# Patient Record
Sex: FEMALE | Race: WHITE | Employment: FULL TIME | URBAN - METROPOLITAN AREA
[De-identification: names, ages, dates, MRNs, and addresses within clinical notes are randomized per-mention and may not be internally consistent; named-entity substitution may affect disease eponyms.]

---

## 2017-09-22 ENCOUNTER — HOSPITAL ENCOUNTER (OUTPATIENT)
Dept: MAMMOGRAPHY | Age: 52
Discharge: HOME | End: 2017-09-22
Attending: OBSTETRICS & GYNECOLOGY | Admitting: OBSTETRICS & GYNECOLOGY

## 2017-09-22 DIAGNOSIS — Z12.31 VISIT FOR SCREENING MAMMOGRAM: ICD-10-CM

## 2018-10-17 ENCOUNTER — HOSPITAL ENCOUNTER (OUTPATIENT)
Dept: WOMENS IMAGING | Age: 53
Discharge: HOME | End: 2018-10-17
Payer: COMMERCIAL

## 2018-10-17 DIAGNOSIS — Z12.39 BREAST CANCER SCREENING: ICD-10-CM

## 2018-10-17 PROCEDURE — 77067 SCR MAMMO BI INCL CAD: CPT

## 2019-01-31 LAB
CANDIDA SPECIES, DNA PROBE: ABNORMAL
GARDNERELLA VAGINALIS, DNA PROBE: ABNORMAL
TRICHOMONAS VAGINALIS DNA: ABNORMAL

## 2019-10-01 LAB
HPV COMMENT: NORMAL
HPV TYPE 16: NOT DETECTED
HPV TYPE 18: NOT DETECTED
HPVOH (OTHER TYPES): NOT DETECTED

## 2019-10-24 ENCOUNTER — HOSPITAL ENCOUNTER (OUTPATIENT)
Dept: MAMMOGRAPHY | Age: 54
Discharge: HOME | End: 2019-10-24
Payer: COMMERCIAL

## 2019-10-24 DIAGNOSIS — Z12.31 BREAST CANCER SCREENING BY MAMMOGRAM: ICD-10-CM

## 2019-10-24 PROCEDURE — 77067 SCR MAMMO BI INCL CAD: CPT

## 2020-12-04 ENCOUNTER — HOSPITAL ENCOUNTER (OUTPATIENT)
Dept: WOMENS IMAGING | Age: 55
Discharge: HOME | End: 2020-12-04
Payer: COMMERCIAL

## 2020-12-04 PROCEDURE — 77067 SCR MAMMO BI INCL CAD: CPT

## 2022-01-14 ENCOUNTER — HOSPITAL ENCOUNTER (OUTPATIENT)
Dept: WOMENS IMAGING | Age: 57
Discharge: HOME | End: 2022-01-14
Payer: COMMERCIAL

## 2022-01-14 VITALS — WEIGHT: 145 LBS | HEIGHT: 67 IN | BODY MASS INDEX: 22.76 KG/M2

## 2022-01-14 DIAGNOSIS — Z12.31 BREAST CANCER SCREENING BY MAMMOGRAM: ICD-10-CM

## 2022-01-14 PROCEDURE — 77063 BREAST TOMOSYNTHESIS BI: CPT

## 2023-01-30 ENCOUNTER — HOSPITAL ENCOUNTER (OUTPATIENT)
Dept: WOMENS IMAGING | Age: 58
Discharge: HOME | End: 2023-01-30
Payer: COMMERCIAL

## 2023-01-30 VITALS — WEIGHT: 150 LBS | HEIGHT: 66 IN | BODY MASS INDEX: 24.11 KG/M2

## 2023-01-30 DIAGNOSIS — Z12.31 VISIT FOR SCREENING MAMMOGRAM: ICD-10-CM

## 2023-01-30 PROCEDURE — 77067 SCR MAMMO BI INCL CAD: CPT

## 2024-02-08 ENCOUNTER — HOSPITAL ENCOUNTER (OUTPATIENT)
Dept: WOMENS IMAGING | Age: 59
Discharge: HOME | End: 2024-02-08
Payer: COMMERCIAL

## 2024-02-08 VITALS — HEIGHT: 66 IN | BODY MASS INDEX: 23.46 KG/M2 | WEIGHT: 146 LBS

## 2024-02-08 DIAGNOSIS — Z12.31 VISIT FOR SCREENING MAMMOGRAM: ICD-10-CM

## 2024-02-08 PROCEDURE — 77063 BREAST TOMOSYNTHESIS BI: CPT

## 2024-05-03 ENCOUNTER — OFFICE VISIT (OUTPATIENT)
Dept: PRIMARY CARE CLINIC | Age: 59
End: 2024-05-03
Payer: COMMERCIAL

## 2024-05-03 VITALS
HEIGHT: 67 IN | HEART RATE: 90 BPM | SYSTOLIC BLOOD PRESSURE: 120 MMHG | WEIGHT: 146 LBS | TEMPERATURE: 97.7 F | DIASTOLIC BLOOD PRESSURE: 78 MMHG | OXYGEN SATURATION: 97 % | BODY MASS INDEX: 22.91 KG/M2

## 2024-05-03 DIAGNOSIS — H66.92 LEFT ACUTE OTITIS MEDIA: Primary | ICD-10-CM

## 2024-05-03 DIAGNOSIS — J02.9 SORE THROAT: ICD-10-CM

## 2024-05-03 LAB — S PYO AG THROAT QL: NORMAL

## 2024-05-03 PROCEDURE — 87880 STREP A ASSAY W/OPTIC: CPT | Performed by: NURSE PRACTITIONER

## 2024-05-03 PROCEDURE — 99213 OFFICE O/P EST LOW 20 MIN: CPT | Performed by: NURSE PRACTITIONER

## 2024-05-03 RX ORDER — AMOXICILLIN AND CLAVULANATE POTASSIUM 875; 125 MG/1; MG/1
1 TABLET, FILM COATED ORAL 2 TIMES DAILY
Qty: 20 TABLET | Refills: 0 | Status: SHIPPED | OUTPATIENT
Start: 2024-05-03 | End: 2024-05-13

## 2024-05-03 SDOH — ECONOMIC STABILITY: FOOD INSECURITY: WITHIN THE PAST 12 MONTHS, THE FOOD YOU BOUGHT JUST DIDN'T LAST AND YOU DIDN'T HAVE MONEY TO GET MORE.: NEVER TRUE

## 2024-05-03 SDOH — ECONOMIC STABILITY: FOOD INSECURITY: WITHIN THE PAST 12 MONTHS, YOU WORRIED THAT YOUR FOOD WOULD RUN OUT BEFORE YOU GOT MONEY TO BUY MORE.: NEVER TRUE

## 2024-05-03 SDOH — ECONOMIC STABILITY: HOUSING INSECURITY
IN THE LAST 12 MONTHS, WAS THERE A TIME WHEN YOU DID NOT HAVE A STEADY PLACE TO SLEEP OR SLEPT IN A SHELTER (INCLUDING NOW)?: NO

## 2024-05-03 SDOH — ECONOMIC STABILITY: INCOME INSECURITY: HOW HARD IS IT FOR YOU TO PAY FOR THE VERY BASICS LIKE FOOD, HOUSING, MEDICAL CARE, AND HEATING?: NOT HARD AT ALL

## 2024-05-03 ASSESSMENT — PATIENT HEALTH QUESTIONNAIRE - PHQ9
SUM OF ALL RESPONSES TO PHQ QUESTIONS 1-9: 0
SUM OF ALL RESPONSES TO PHQ QUESTIONS 1-9: 0
1. LITTLE INTEREST OR PLEASURE IN DOING THINGS: NOT AT ALL
SUM OF ALL RESPONSES TO PHQ QUESTIONS 1-9: 0
SUM OF ALL RESPONSES TO PHQ9 QUESTIONS 1 & 2: 0
2. FEELING DOWN, DEPRESSED OR HOPELESS: NOT AT ALL
SUM OF ALL RESPONSES TO PHQ QUESTIONS 1-9: 0

## 2024-05-03 NOTE — PROGRESS NOTES
°F (36.5 °C)   SpO2: 97%   Weight: 66.2 kg (146 lb)   Height: 1.689 m (5' 6.5\")         Physical Exam  Constitutional:       Appearance: Normal appearance.   HENT:      Head: Normocephalic and atraumatic.      Right Ear: Tympanic membrane, ear canal and external ear normal.      Left Ear: Tenderness present. A middle ear effusion is present. No mastoid tenderness. Tympanic membrane is erythematous.      Nose: Nose normal.      Mouth/Throat:      Mouth: Mucous membranes are moist.      Pharynx: Oropharynx is clear.   Eyes:      Extraocular Movements: Extraocular movements intact.      Conjunctiva/sclera: Conjunctivae normal.      Pupils: Pupils are equal, round, and reactive to light.   Cardiovascular:      Rate and Rhythm: Normal rate and regular rhythm.      Pulses: Normal pulses.      Heart sounds: Normal heart sounds.   Pulmonary:      Effort: Pulmonary effort is normal.      Breath sounds: Normal breath sounds.   Musculoskeletal:      Cervical back: Normal range of motion and neck supple.   Lymphadenopathy:      Cervical: No cervical adenopathy.   Neurological:      Mental Status: She is alert.         ASSESSMENT/PLAN:  1. Left acute otitis media  -     amoxicillin-clavulanate (AUGMENTIN) 875-125 MG per tablet; Take 1 tablet by mouth 2 times daily for 10 days, Disp-20 tablet, R-0Normal  2. Sore throat  -     POCT rapid strep A- Negative, Will send for culture  -     Culture, Throat- will notify when resulted and POC  Schedule need fasting physical     Home Care Instructions  Notify office if you do not improve or have worsening of condition.  Take medication as prescribed  Take antibiotic medication until ALL GONE  Drink plenty of fluids and rest  Do not eat or drink after anyone  Do not share utensils  Practice good hand hygiene  May sleep with humidifier as needed  May take tylenol/Ibuprofen Tylenol and Motrin every 6 hours alternating (so that he/she is getting either one every 3 hours) for the next 48

## 2024-05-03 NOTE — PATIENT INSTRUCTIONS
Schedule need fasting physical     Home Care Instructions  Notify office if you do not improve or have worsening of condition.  Take medication as prescribed  Take antibiotic medication until ALL GONE  Drink plenty of fluids and rest  Do not eat or drink after anyone  Do not share utensils  Practice good hand hygiene  May sleep with humidifier as needed  May take tylenol/Ibuprofen Tylenol and Motrin every 6 hours alternating (so that he/she is getting either one every 3 hours) for the next 48 hours.  After day 3 change out toothbrush to a new one  Cover your mouth and nose when you cough or sneeze  Sore throat: gargle with warm salt water 3-4 times a day, you can use ice, warm chicken noodle soup, soft foods, popsicles, cough drops  Cough- suggest that airway irritation contributing to cough be relieved with oral hydration, warm fluids (eg, tea, chicken soup), honey (in children older than one year), or cough lozenges or hard candy (in children in whom they are not an aspiration risk) rather than OTC or prescription antitussives, antihistamines, expectorants, or mucolytics

## 2024-05-06 LAB
BACTERIA THROAT AEROBE CULT: ABNORMAL
BACTERIA THROAT AEROBE CULT: ABNORMAL
ORGANISM: ABNORMAL

## 2024-05-06 NOTE — RESULT ENCOUNTER NOTE
Please call Hailee, to see how she is feeling, her Preliminary culture does show some growth, if she is not seeing an improvement with the antibiotic given would like to change to a different antibiotic such as Bactrim.

## 2024-05-08 ASSESSMENT — ENCOUNTER SYMPTOMS
COUGH: 1
SHORTNESS OF BREATH: 0
VOMITING: 0
DIARRHEA: 0
NAUSEA: 0
SORE THROAT: 1

## 2024-08-02 ENCOUNTER — OFFICE VISIT (OUTPATIENT)
Dept: PRIMARY CARE CLINIC | Age: 59
End: 2024-08-02

## 2024-08-02 VITALS
DIASTOLIC BLOOD PRESSURE: 80 MMHG | HEIGHT: 65 IN | BODY MASS INDEX: 24.66 KG/M2 | OXYGEN SATURATION: 97 % | SYSTOLIC BLOOD PRESSURE: 120 MMHG | WEIGHT: 148 LBS | HEART RATE: 84 BPM | TEMPERATURE: 97.5 F

## 2024-08-02 DIAGNOSIS — Z13.0 SCREENING FOR DEFICIENCY ANEMIA: ICD-10-CM

## 2024-08-02 DIAGNOSIS — Z00.00 WELL ADULT EXAM: Primary | ICD-10-CM

## 2024-08-02 DIAGNOSIS — Z13.1 SCREENING FOR DIABETES MELLITUS (DM): ICD-10-CM

## 2024-08-02 DIAGNOSIS — Z81.8 FAMILY HISTORY OF DEMENTIA: ICD-10-CM

## 2024-08-02 DIAGNOSIS — Z13.29 SCREENING FOR THYROID DISORDER: ICD-10-CM

## 2024-08-02 DIAGNOSIS — Z13.220 SCREENING FOR HYPERLIPIDEMIA: ICD-10-CM

## 2024-08-02 DIAGNOSIS — R53.83 FATIGUE, UNSPECIFIED TYPE: ICD-10-CM

## 2024-08-02 PROBLEM — N85.2 HYPERTROPHY OF UTERUS: Status: ACTIVE | Noted: 2024-02-08

## 2024-08-02 RX ORDER — ASCORBIC ACID 500 MG
1000 POWDER IN PACKET (EA) ORAL DAILY
COMMUNITY

## 2024-08-02 RX ORDER — LANOLIN ALCOHOL/MO/W.PET/CERES
400 CREAM (GRAM) TOPICAL DAILY
COMMUNITY

## 2024-08-02 ASSESSMENT — ANXIETY QUESTIONNAIRES
6. BECOMING EASILY ANNOYED OR IRRITABLE: SEVERAL DAYS
IF YOU CHECKED OFF ANY PROBLEMS ON THIS QUESTIONNAIRE, HOW DIFFICULT HAVE THESE PROBLEMS MADE IT FOR YOU TO DO YOUR WORK, TAKE CARE OF THINGS AT HOME, OR GET ALONG WITH OTHER PEOPLE: SOMEWHAT DIFFICULT
3. WORRYING TOO MUCH ABOUT DIFFERENT THINGS: SEVERAL DAYS
2. NOT BEING ABLE TO STOP OR CONTROL WORRYING: NOT AT ALL
4. TROUBLE RELAXING: NOT AT ALL
7. FEELING AFRAID AS IF SOMETHING AWFUL MIGHT HAPPEN: NOT AT ALL
GAD7 TOTAL SCORE: 2
1. FEELING NERVOUS, ANXIOUS, OR ON EDGE: NOT AT ALL
5. BEING SO RESTLESS THAT IT IS HARD TO SIT STILL: NOT AT ALL

## 2024-08-02 ASSESSMENT — PATIENT HEALTH QUESTIONNAIRE - PHQ9
SUM OF ALL RESPONSES TO PHQ QUESTIONS 1-9: 1
1. LITTLE INTEREST OR PLEASURE IN DOING THINGS: SEVERAL DAYS
SUM OF ALL RESPONSES TO PHQ QUESTIONS 1-9: 1
SUM OF ALL RESPONSES TO PHQ QUESTIONS 1-9: 1
SUM OF ALL RESPONSES TO PHQ9 QUESTIONS 1 & 2: 1
2. FEELING DOWN, DEPRESSED OR HOPELESS: NOT AT ALL
SUM OF ALL RESPONSES TO PHQ QUESTIONS 1-9: 1

## 2024-08-02 NOTE — PROGRESS NOTES
concerns d/t strong fam h/o dementia, provided CARLOS will review at lab visit.    Fatigue, unspecified type  -     CBC with Auto Differential; Future  -     Comprehensive Metabolic Panel; Future  -     TSH with Reflex; Future  -     Vitamin B12 & Folate; Future    Family history of dementia  -     Vitamin B12 & Folate; Future    Screening for deficiency anemia  -     CBC with Auto Differential; Future    Screening for hyperlipidemia  -     Lipid, Fasting; Future    Screening for thyroid disorder  -     TSH with Reflex; Future    Screening for diabetes mellitus (DM)  -     Hemoglobin A1C; Future      Patient given educational handouts and has had all questions answered.  Patient voices understanding and agrees to plans along with risks and benefits of plan. Patient is instructed to continue prior meds, diet, and exercise plans as instructed. Patient agrees to follow up as instructed and sooner if needed.  Patient agrees to go to ER if condition becomes emergent.     This dictation was performed with a verbal recognition program (DRAGON) and it was checked for errors.  It is possible that there are still dictated errors within this office note.  If so, please bring any errors to my attention for an addendum.  All efforts were made to ensure that this office note is accurate.      Patient Education:    Counseled on importance of healthy diet and regular exercise of at least 30 minutes on four or more days during the week.  Counseled on skin safety, SPF 30 or higher prior to going outdoors and reapplication every twohours while outside. Monitor moles for changes, report to provider if greater than 6 mm, color variations, asymmetry, redness, scales, and/or overlying skin changes  Counseled on safety, wear seatbelt, do not consumealcohol and drive or drive with anyone who has consumed alcohol  Counseled on safe sex practices, wear condoms, limit number of sexual partners  Counseled on importance of monthly self breast

## 2024-08-03 PROBLEM — D25.1 INTRAMURAL LEIOMYOMA OF UTERUS: Status: ACTIVE | Noted: 2024-08-03

## 2024-08-03 PROBLEM — Z79.890 HORMONE REPLACEMENT THERAPY (POSTMENOPAUSAL): Status: ACTIVE | Noted: 2024-08-03

## 2024-08-03 ASSESSMENT — ENCOUNTER SYMPTOMS
SHORTNESS OF BREATH: 0
COUGH: 0
NAUSEA: 0
DIARRHEA: 0
CONSTIPATION: 0
VOMITING: 0

## 2024-08-06 ENCOUNTER — OFFICE VISIT (OUTPATIENT)
Dept: PRIMARY CARE CLINIC | Age: 59
End: 2024-08-06
Payer: COMMERCIAL

## 2024-08-06 DIAGNOSIS — Z13.29 SCREENING FOR THYROID DISORDER: ICD-10-CM

## 2024-08-06 DIAGNOSIS — Z81.8 FAMILY HISTORY OF DEMENTIA: ICD-10-CM

## 2024-08-06 DIAGNOSIS — R53.83 FATIGUE, UNSPECIFIED TYPE: ICD-10-CM

## 2024-08-06 DIAGNOSIS — Z13.1 SCREENING FOR DIABETES MELLITUS (DM): ICD-10-CM

## 2024-08-06 DIAGNOSIS — Z13.0 SCREENING FOR DEFICIENCY ANEMIA: ICD-10-CM

## 2024-08-06 DIAGNOSIS — Z13.220 SCREENING FOR HYPERLIPIDEMIA: ICD-10-CM

## 2024-08-06 LAB
BASOPHILS # BLD: 0.1 K/UL (ref 0–0.2)
BASOPHILS NFR BLD: 0.8 %
DEPRECATED RDW RBC AUTO: 12.9 % (ref 12.4–15.4)
EOSINOPHIL # BLD: 0.2 K/UL (ref 0–0.6)
EOSINOPHIL NFR BLD: 3.8 %
HCT VFR BLD AUTO: 44.5 % (ref 36–48)
HGB BLD-MCNC: 14.7 G/DL (ref 12–16)
LYMPHOCYTES # BLD: 2.1 K/UL (ref 1–5.1)
LYMPHOCYTES NFR BLD: 33.2 %
MCH RBC QN AUTO: 31.4 PG (ref 26–34)
MCHC RBC AUTO-ENTMCNC: 33.1 G/DL (ref 31–36)
MCV RBC AUTO: 94.8 FL (ref 80–100)
MONOCYTES # BLD: 0.6 K/UL (ref 0–1.3)
MONOCYTES NFR BLD: 8.6 %
NEUTROPHILS # BLD: 3.5 K/UL (ref 1.7–7.7)
NEUTROPHILS NFR BLD: 53.6 %
PLATELET # BLD AUTO: 282 K/UL (ref 135–450)
PMV BLD AUTO: 8.3 FL (ref 5–10.5)
RBC # BLD AUTO: 4.7 M/UL (ref 4–5.2)
WBC # BLD AUTO: 6.5 K/UL (ref 4–11)

## 2024-08-06 PROCEDURE — 36415 COLL VENOUS BLD VENIPUNCTURE: CPT | Performed by: NURSE PRACTITIONER

## 2024-08-06 NOTE — PROGRESS NOTES
Lab draw Left AC one attempt, butterfly  Two sst and one lav    1. Fatigue, unspecified type  - Vitamin B12 & Folate  - TSH with Reflex  - Comprehensive Metabolic Panel  - CBC with Auto Differential    2. Family history of dementia  - Vitamin B12 & Folate    3. Screening for thyroid disorder  - TSH with Reflex    4. Screening for hyperlipidemia  - Lipid, Fasting    5. Screening for diabetes mellitus (DM)  - Hemoglobin A1C    6. Screening for deficiency anemia  - CBC with Auto Differential    Discussed CARLOS test- pt passed 21/22, discussed dementia/Alzheimer's, provided information regarding Alzheimer's network possible need for referral to neurology due to patient's family history.

## 2024-08-07 LAB
ALBUMIN SERPL-MCNC: 4.6 G/DL (ref 3.4–5)
ALBUMIN/GLOB SERPL: 1.8 {RATIO} (ref 1.1–2.2)
ALP SERPL-CCNC: 57 U/L (ref 40–129)
ALT SERPL-CCNC: 15 U/L (ref 10–40)
ANION GAP SERPL CALCULATED.3IONS-SCNC: 14 MMOL/L (ref 3–16)
AST SERPL-CCNC: 22 U/L (ref 15–37)
BILIRUB SERPL-MCNC: 0.3 MG/DL (ref 0–1)
BUN SERPL-MCNC: 20 MG/DL (ref 7–20)
CALCIUM SERPL-MCNC: 9.3 MG/DL (ref 8.3–10.6)
CHLORIDE SERPL-SCNC: 98 MMOL/L (ref 99–110)
CO2 SERPL-SCNC: 24 MMOL/L (ref 21–32)
CREAT SERPL-MCNC: 1 MG/DL (ref 0.6–1.2)
EST. AVERAGE GLUCOSE BLD GHB EST-MCNC: 93.9 MG/DL
FOLATE SERPL-MCNC: >20 NG/ML (ref 4.78–24.2)
GFR SERPLBLD CREATININE-BSD FMLA CKD-EPI: 64 ML/MIN/{1.73_M2}
GLUCOSE SERPL-MCNC: 98 MG/DL (ref 70–99)
HBA1C MFR BLD: 4.9 %
HDLC SERPL-MCNC: 61 MG/DL (ref 40–60)
LDL CHOLESTEROL: 130 MG/DL
POTASSIUM SERPL-SCNC: 4.6 MMOL/L (ref 3.5–5.1)
PROT SERPL-MCNC: 7.1 G/DL (ref 6.4–8.2)
SODIUM SERPL-SCNC: 136 MMOL/L (ref 136–145)
TRIGL SERPL-MCNC: 80 MG/DL (ref 0–150)
TSH SERPL DL<=0.005 MIU/L-ACNC: 1.91 UIU/ML (ref 0.27–4.2)
VIT B12 SERPL-MCNC: >2000 PG/ML (ref 211–911)
VLDLC SERPL CALC-MCNC: 16 MG/DL

## 2024-12-10 ENCOUNTER — OFFICE VISIT (OUTPATIENT)
Dept: PRIMARY CARE CLINIC | Age: 59
End: 2024-12-10

## 2024-12-10 VITALS
OXYGEN SATURATION: 97 % | SYSTOLIC BLOOD PRESSURE: 110 MMHG | BODY MASS INDEX: 25.17 KG/M2 | HEART RATE: 82 BPM | DIASTOLIC BLOOD PRESSURE: 78 MMHG | TEMPERATURE: 97.2 F | WEIGHT: 152 LBS

## 2024-12-10 DIAGNOSIS — M79.89 SWELLING OF RIGHT LITTLE FINGER: Primary | ICD-10-CM

## 2024-12-10 DIAGNOSIS — M79.644 FINGER PAIN, RIGHT: ICD-10-CM

## 2024-12-10 NOTE — PROGRESS NOTES
12/10/2024    Hailee Carlos (:  1965) is a 59 y.o. female, here for evaluation of the following medical concerns:    Chief Complaint   Patient presents with    right pinky finger pain     And swelling        Patient consented to the use of [employer] to record and transcribe notes during this visit.    The patient, Hailee, presents with a chief complaint of persistent pain and swelling in her right pinky finger. She reports that the pain has been present for the past two months and has worsened over time. The swelling remains constant and does not subside with icing. . Hailee denies any history of injury to the finger and has not noticed any redness, warmth, or throbbing pain extending beyond the knuckle. She has been typing since  and believes the issue may be related to this repetitive activity, she denies injury. Hailee has tried castor oil as a home remedy without relief.    Hailee has no history of arthritis or rheumatoid arthritis and has never had any x-rays of her hands. She reports that the pain sometimes wakes her up in the middle of the night. The patient also mentions a family history of her twin brother having a fibrosarcoma in his finger, which eventually led to his death at the age of 14.    The patient has tried various over-the-counter treatments, including asper cream with lidocaine and Tylenol Arthritis, but has not experienced significant relief. She is interested in seeing a hand surgeon for further evaluation and treatment.      Past Medical History:   Diagnosis Date    Arthritis     Erythema nodosum        Patient Active Problem List   Diagnosis    Erythema nodosum    Hypertrophy of uterus    Cervical disc disorder with radiculopathy of cervicothoracic region    Attention deficit hyperactivity disorder (ADHD)    Intramural leiomyoma of uterus    Hormone replacement therapy (postmenopausal)       Prior to Visit Medications    Medication Sig Taking? Authorizing Provider

## 2024-12-11 LAB
CRP SERPL-MCNC: <3 MG/L (ref 0–5.1)
ERYTHROCYTE [SEDIMENTATION RATE] IN BLOOD BY WESTERGREN METHOD: 8 MM/HR (ref 0–30)
RHEUMATOID FACT SER IA-ACNC: <10 IU/ML
URATE SERPL-MCNC: 4.1 MG/DL (ref 2.6–6)

## 2025-02-09 SDOH — HEALTH STABILITY: PHYSICAL HEALTH: ON AVERAGE, HOW MANY MINUTES DO YOU ENGAGE IN EXERCISE AT THIS LEVEL?: 30 MIN

## 2025-02-09 SDOH — HEALTH STABILITY: PHYSICAL HEALTH: ON AVERAGE, HOW MANY DAYS PER WEEK DO YOU ENGAGE IN MODERATE TO STRENUOUS EXERCISE (LIKE A BRISK WALK)?: 5 DAYS

## 2025-02-12 ENCOUNTER — OFFICE VISIT (OUTPATIENT)
Dept: ORTHOPEDIC SURGERY | Age: 60
End: 2025-02-12
Payer: COMMERCIAL

## 2025-02-12 VITALS — WEIGHT: 152 LBS | RESPIRATION RATE: 16 BRPM | BODY MASS INDEX: 25.33 KG/M2 | HEIGHT: 65 IN

## 2025-02-12 DIAGNOSIS — M79.644 PAIN OF FINGER OF RIGHT HAND: Primary | ICD-10-CM

## 2025-02-12 PROCEDURE — 99243 OFF/OP CNSLTJ NEW/EST LOW 30: CPT | Performed by: ORTHOPAEDIC SURGERY

## 2025-02-12 NOTE — PROGRESS NOTES
Ms. Hailee Carlos is a 59 y.o. right handed woman  who is seen today in Hand Surgical Consultation at the request of Betsy Garcia APRN - CNP.    She is seen today regarding a 4 month(s) history of right Small Finger pain and swelling without history previous of injury.  She was seen for this concern by her primary care physician; previous treatment has included conservative measures.  She reports mild pain located in the diffuse Right Small Finger with swelling dorsally over the  PIP Joint, no tenderness of the remaining hand, wrist, or elbow.  She  notes today, no neurologic symptoms in the hand. Symptoms show no change over time.      I have today reviewed with Hailee Carlos the clinically relevant, past medical history, medications, allergies,  family history, social history, and Review Of Systems & I have documented any details relevant to today's presenting complaints in my history above.  Ms. Hailee Carlos's self-reported past medical history, medications, allergies,  family history, social history, and Review Of Systems have been scanned into the chart under the \"Media\" tab.    Physical Exam:  Ms. Hailee Carlos's most recent vitals:  Vitals  Respirations: 16  Height: 165.1 cm (5' 5\")  Weight - Scale: 68.9 kg (152 lb)    She is well nourished, oriented to person, place & time.  She demonstrates appropriate mood and affect as well as normal gait and station.    Skin: Normal in appearance, Normal Color, and Free of Lesions Bilaterally   Digital range of motion is without significant limitation Small Finger PIP Joint otherwise normal bilaterally  Wrist range of motion is without significant limitation bilaterally.  There is no evidence of gross joint instability bilaterally.  Sensation is subjectively normal in the Whole Hand bilaterally.  Vascular examination reveals normal and good capillary refill bilaterally.  Swelling is mild in the Small Finger dorsally over the PIP Joint but without

## 2025-02-20 ENCOUNTER — HOSPITAL ENCOUNTER (OUTPATIENT)
Dept: WOMENS IMAGING | Age: 60
Discharge: HOME | End: 2025-02-20
Payer: COMMERCIAL

## 2025-02-20 VITALS — HEIGHT: 66 IN | WEIGHT: 150 LBS | BODY MASS INDEX: 24.11 KG/M2

## 2025-02-20 DIAGNOSIS — Z12.31 VISIT FOR SCREENING MAMMOGRAM: ICD-10-CM

## 2025-02-20 PROCEDURE — 77063 BREAST TOMOSYNTHESIS BI: CPT

## 2025-03-10 ENCOUNTER — TELEPHONE (OUTPATIENT)
Age: 60
End: 2025-03-10

## 2025-03-10 NOTE — TELEPHONE ENCOUNTER
This MA attempted to reach pt with intent to verify preferred time for scheduled VV. Is she available now or prefer to connect at 1:15pm. No answer. This MA left message for pt advising this MA will attempt at a later time to reach pt.

## 2025-04-02 LAB
ESTRADIOL, SENSITIVE: 76.4 PG/ML
PROGESTERONE LEVEL: 12.2 NG/ML
SEX HORMONE BINDING GLOBULIN: 50 NMOL/L (ref 17–125)
TESTOSTERONE FREE-MALE: 48.2 PG/ML (ref 0.6–3.8)
TESTOSTERONE TOTAL-MALE: 341 NG/DL (ref 5–32)

## 2025-05-10 SDOH — HEALTH STABILITY: PHYSICAL HEALTH: ON AVERAGE, HOW MANY DAYS PER WEEK DO YOU ENGAGE IN MODERATE TO STRENUOUS EXERCISE (LIKE A BRISK WALK)?: 5 DAYS

## 2025-05-10 SDOH — HEALTH STABILITY: PHYSICAL HEALTH: ON AVERAGE, HOW MANY MINUTES DO YOU ENGAGE IN EXERCISE AT THIS LEVEL?: 30 MIN

## 2025-05-13 ENCOUNTER — OFFICE VISIT (OUTPATIENT)
Dept: PRIMARY CARE CLINIC | Age: 60
End: 2025-05-13
Payer: COMMERCIAL

## 2025-05-13 VITALS
BODY MASS INDEX: 24.4 KG/M2 | TEMPERATURE: 97.6 F | DIASTOLIC BLOOD PRESSURE: 76 MMHG | SYSTOLIC BLOOD PRESSURE: 118 MMHG | HEART RATE: 92 BPM | OXYGEN SATURATION: 96 % | WEIGHT: 151.2 LBS

## 2025-05-13 DIAGNOSIS — E78.2 MIXED HYPERLIPIDEMIA: ICD-10-CM

## 2025-05-13 DIAGNOSIS — R41.3 MEMORY CHANGE: ICD-10-CM

## 2025-05-13 DIAGNOSIS — R22.31 FINGER MASS, RIGHT: Primary | ICD-10-CM

## 2025-05-13 PROCEDURE — 99214 OFFICE O/P EST MOD 30 MIN: CPT | Performed by: NURSE PRACTITIONER

## 2025-05-13 SDOH — ECONOMIC STABILITY: FOOD INSECURITY: WITHIN THE PAST 12 MONTHS, THE FOOD YOU BOUGHT JUST DIDN'T LAST AND YOU DIDN'T HAVE MONEY TO GET MORE.: NEVER TRUE

## 2025-05-13 SDOH — ECONOMIC STABILITY: FOOD INSECURITY: WITHIN THE PAST 12 MONTHS, YOU WORRIED THAT YOUR FOOD WOULD RUN OUT BEFORE YOU GOT MONEY TO BUY MORE.: NEVER TRUE

## 2025-05-15 ASSESSMENT — ENCOUNTER SYMPTOMS
COUGH: 0
WHEEZING: 0
SHORTNESS OF BREATH: 0

## 2025-09-04 LAB
ESTRADIOL, SENSITIVE: 34.8 PG/ML
PROGESTERONE LEVEL: 6.2 NG/ML